# Patient Record
Sex: MALE | Race: WHITE
[De-identification: names, ages, dates, MRNs, and addresses within clinical notes are randomized per-mention and may not be internally consistent; named-entity substitution may affect disease eponyms.]

---

## 2017-06-19 ENCOUNTER — HOSPITAL ENCOUNTER (OUTPATIENT)
Dept: HOSPITAL 33 - SDC | Age: 77
Discharge: HOME | End: 2017-06-19
Attending: FAMILY MEDICINE
Payer: MEDICARE

## 2017-06-19 VITALS — DIASTOLIC BLOOD PRESSURE: 87 MMHG | SYSTOLIC BLOOD PRESSURE: 147 MMHG | HEART RATE: 59 BPM

## 2017-06-19 VITALS — OXYGEN SATURATION: 99 %

## 2017-06-19 DIAGNOSIS — Z79.4: ICD-10-CM

## 2017-06-19 DIAGNOSIS — E11.9: ICD-10-CM

## 2017-06-19 DIAGNOSIS — D12.5: Primary | ICD-10-CM

## 2017-06-19 PROCEDURE — 99100 ANES PT EXTEME AGE<1 YR&>70: CPT

## 2017-06-19 PROCEDURE — 0DBN8ZX EXCISION OF SIGMOID COLON, VIA NATURAL OR ARTIFICIAL OPENING ENDOSCOPIC, DIAGNOSTIC: ICD-10-PCS | Performed by: FAMILY MEDICINE

## 2017-06-19 PROCEDURE — 00810: CPT

## 2017-06-19 PROCEDURE — 82962 GLUCOSE BLOOD TEST: CPT

## 2017-06-19 PROCEDURE — 36415 COLL VENOUS BLD VENIPUNCTURE: CPT

## 2017-06-19 NOTE — OP
SURGERY DATE:    06/19/17



SURGERY TIME:      0743



PREOPERATIVE DIAGNOSIS:    

1.  HISTORY OF COLON POLYPS.



POSTOPERATIVE DIAGNOSIS:    

1.  SIGMOID COLON POLYP.



PROCEDURE:    

1.  Colonoscopy with polypectomy.



SURGEON:    Dr. Owen.



ANESTHESIA:    MAC. Medications given by the Anesthesia Department.



BRIEF HISTORY:    The patient is a 75 y/o WM patient presenting now for colonoscopic 
evaluation. He reports he cannot remember the last time he had a colonoscopy performed, 
although he has had colon polyps removed previously. The patient is felt to need to have 
endoscopic evaluation. He was reappraised of the risks of the procedure including the risk 
of perforation, phlebitis, untoward reaction to medication, bleeding, and missed lesions.  
The patient verbalized his understanding and desired to have the procedure performed.



DESCRIPTION OF PROCEDURE:    The patient was given the medications by the Anesthesia 
Department. He had continuous pulse oximetry, ECG monitoring, intermittent BP monitoring, 
and end tidal CO2 monitoring during the examination. He was placed in the left lateral 
decubitus position. A digital rectal examination was performed and revealed normal anal 
sphincter tone, no masses, and a normal prostate.  The flexible Olympus pediatric 
colonoscope was used to intubate the rectum.  A view of the colon was developed 
sequentially to the cecum.  Upon insertion and withdrawal, including a retroflex view in 
the rectum, was noted a 1.2 cm diameter sized polyp that was pedunculated in nature. This 
was removed using the polypectomy snare and retrieved for pathologic evaluation. Upon 
insertion and withdrawal, including a retroflex view in the rectum, no other mucosal 
lesions were encountered. The scope was removed from the patient who tolerated the 
procedure well and was sent back to OP recovery in good condition. The prep was noted to 
be fair.

## 2019-08-22 ENCOUNTER — HOSPITAL ENCOUNTER (OUTPATIENT)
Dept: HOSPITAL 33 - SDC | Age: 79
Discharge: HOME | End: 2019-08-22
Attending: FAMILY MEDICINE
Payer: MEDICARE

## 2019-08-22 VITALS — SYSTOLIC BLOOD PRESSURE: 139 MMHG | DIASTOLIC BLOOD PRESSURE: 62 MMHG | HEART RATE: 62 BPM

## 2019-08-22 VITALS — OXYGEN SATURATION: 97 %

## 2019-08-22 DIAGNOSIS — Z09: ICD-10-CM

## 2019-08-22 DIAGNOSIS — Z86.010: ICD-10-CM

## 2019-08-22 DIAGNOSIS — D12.5: Primary | ICD-10-CM

## 2019-08-22 DIAGNOSIS — E11.9: ICD-10-CM

## 2019-08-22 PROCEDURE — 99100 ANES PT EXTEME AGE<1 YR&>70: CPT

## 2019-08-22 PROCEDURE — 82962 GLUCOSE BLOOD TEST: CPT

## 2019-08-22 PROCEDURE — 88305 TISSUE EXAM BY PATHOLOGIST: CPT

## 2019-08-22 NOTE — OP
SURGERY DATE/TIME:  08/22/2019  0758    



PREOPERATIVE DIAGNOSIS:      History of colon polyps.



POSTOPERATIVE DIAGNOSIS:    Sigmoid colon polyp x1. 



PROCEDURE:    Colonoscopy. 



SURGEON: Bogdan Pruitt M.D.



ANESTHESIA:  MAC by Jovon Cross CRNA. 



ESTIMATED BLOOD LOSS:  Minimal. 



SPECIMENS:  Hot forceps polypectomy from the sigmoid colon x1.         



DESCRIPTION OF PROCEDURE: After informed written consent was obtained, the patient was 
taken to the endoscopy suite. He underwent monitored anesthesia and digital rectal exam 
showed normal sphincter tone and no internal lesions. The scope was inserted into the 
rectum and sequentially the entire colonic mucosa was traversed. The level of cecum was 
reached and verified with direct visualization of ileocecal valve. Upon withdrawal careful 
mucosal inspection revealed a small sessile polyp in the sigmoid colon, pedunculated and 
grasped at the base with hot forceps, cauterized and removed in multiple fragments but 
with good removal of the entire lesion. Good hemostasis following removal. The remainder 
of the exam was unremarkable. Prior to withdrawal retroflexion was performed and showed no 
internal lesions. The scope was removed and the patient was transferred to the recovery 
room in good condition.

## 2022-08-25 ENCOUNTER — HOSPITAL ENCOUNTER (EMERGENCY)
Dept: HOSPITAL 33 - ED | Age: 82
Discharge: LEFT BEFORE DISCHARGE | End: 2022-08-25
Payer: MEDICARE

## 2022-08-25 VITALS — OXYGEN SATURATION: 97 %

## 2022-08-25 VITALS — SYSTOLIC BLOOD PRESSURE: 146 MMHG | DIASTOLIC BLOOD PRESSURE: 88 MMHG | HEART RATE: 72 BPM

## 2022-08-25 DIAGNOSIS — Z79.899: ICD-10-CM

## 2022-08-25 DIAGNOSIS — Z72.0: ICD-10-CM

## 2022-08-25 DIAGNOSIS — R26.0: ICD-10-CM

## 2022-08-25 DIAGNOSIS — Z79.84: ICD-10-CM

## 2022-08-25 DIAGNOSIS — Z91.81: ICD-10-CM

## 2022-08-25 DIAGNOSIS — I63.9: Primary | ICD-10-CM

## 2022-08-25 DIAGNOSIS — E11.9: ICD-10-CM

## 2022-08-25 LAB
ALBUMIN SERPL-MCNC: 4.2 G/DL (ref 3.5–5)
ALP SERPL-CCNC: 97 U/L (ref 38–126)
ALT SERPL-CCNC: 16 U/L (ref 0–50)
AMPHETAMINES UR QL: NEGATIVE
ANION GAP SERPL CALC-SCNC: 15.3 MEQ/L (ref 5–15)
AST SERPL QL: 20 U/L (ref 17–59)
BARBITURATES UR QL: NEGATIVE
BASOPHILS # BLD AUTO: 0.08 X10^3/UL (ref 0–0.4)
BENZODIAZ UR QL SCN: NEGATIVE
BILIRUB BLD-MCNC: 0.9 MG/DL (ref 0.2–1.3)
BNP SERPL-MCNC: 250 PG/ML (ref 0–1800)
BUN SERPL-MCNC: 24 MG/DL (ref 9–20)
CALCIUM SPEC-MCNC: 9.5 MG/DL (ref 8.4–10.2)
CHLORIDE SERPL-SCNC: 106 MMOL/L (ref 98–107)
CO2 SERPL-SCNC: 20 MMOL/L (ref 22–30)
COCAINE UR QL SCN: NEGATIVE
CREAT SERPL-MCNC: 1.53 MG/DL (ref 0.66–1.25)
EOSINOPHIL # BLD AUTO: 0.17 X10^3/UL (ref 0–0.5)
GFR SERPLBLD BASED ON 1.73 SQ M-ARVRAT: 46.9 ML/MIN
GLUCOSE SERPL-MCNC: 134 MG/DL (ref 74–106)
GLUCOSE UR-MCNC: 500 MG/DL
HCT VFR BLD AUTO: 44.6 % (ref 42–50)
HGB BLD-MCNC: 14.8 G/DL (ref 12.5–18)
LYMPHOCYTES # SPEC AUTO: 1.66 X10^3/UL (ref 1–4.6)
MAGNESIUM SERPL-MCNC: 1.9 MG/DL (ref 1.6–2.3)
MCH RBC QN AUTO: 30.8 PG (ref 26–32)
MCHC RBC AUTO-ENTMCNC: 33.2 G/DL (ref 32–36)
METHADONE UR QL: NEGATIVE
MONOCYTES # BLD AUTO: 0.35 X10^3/UL (ref 0–1.3)
OPIATES UR QL: NEGATIVE
PCP UR QL CFM>20 NG/ML: NEGATIVE
PLATELET # BLD AUTO: 196 X10^3/UL (ref 150–450)
POTASSIUM SERPLBLD-SCNC: 4.1 MMOL/L (ref 3.5–5.1)
PROT SERPL-MCNC: 7.2 G/DL (ref 6.3–8.2)
PROT UR STRIP-MCNC: NEGATIVE MG/DL
RBC # BLD AUTO: 4.8 X10^6/UL (ref 4.1–5.6)
RBC # UR AUTO: NEGATIVE ERY/UL (ref 0–5)
RBC #/AREA URNS HPF: (no result) /HPF (ref 0–2)
SODIUM SERPL-SCNC: 137 MMOL/L (ref 137–145)
THC UR QL SCN: NEGATIVE
UA DIPSTICK PNL UR: (no result)
URINE CULTURED INDICATED?: NO
WBC # BLD AUTO: 5.7 X10^3/UL (ref 4–10.5)
WBC #/AREA URNS HPF: (no result) /HPF (ref 0–5)

## 2022-08-25 PROCEDURE — 99284 EMERGENCY DEPT VISIT MOD MDM: CPT

## 2022-08-25 PROCEDURE — 93005 ELECTROCARDIOGRAM TRACING: CPT

## 2022-08-25 PROCEDURE — 70450 CT HEAD/BRAIN W/O DYE: CPT

## 2022-08-25 PROCEDURE — 87040 BLOOD CULTURE FOR BACTERIA: CPT

## 2022-08-25 PROCEDURE — 80053 COMPREHEN METABOLIC PANEL: CPT

## 2022-08-25 PROCEDURE — 83880 ASSAY OF NATRIURETIC PEPTIDE: CPT

## 2022-08-25 PROCEDURE — 36000 PLACE NEEDLE IN VEIN: CPT

## 2022-08-25 PROCEDURE — 84484 ASSAY OF TROPONIN QUANT: CPT

## 2022-08-25 PROCEDURE — 83605 ASSAY OF LACTIC ACID: CPT

## 2022-08-25 PROCEDURE — 82947 ASSAY GLUCOSE BLOOD QUANT: CPT

## 2022-08-25 PROCEDURE — 84145 PROCALCITONIN (PCT): CPT

## 2022-08-25 PROCEDURE — 71045 X-RAY EXAM CHEST 1 VIEW: CPT

## 2022-08-25 PROCEDURE — 83735 ASSAY OF MAGNESIUM: CPT

## 2022-08-25 PROCEDURE — 81015 MICROSCOPIC EXAM OF URINE: CPT

## 2022-08-25 PROCEDURE — 85025 COMPLETE CBC W/AUTO DIFF WBC: CPT

## 2022-08-25 PROCEDURE — 36415 COLL VENOUS BLD VENIPUNCTURE: CPT

## 2022-08-25 PROCEDURE — 80307 DRUG TEST PRSMV CHEM ANLYZR: CPT

## 2022-08-25 NOTE — XRAY
Indication: Frequent falls, confusion, and unsteady gait one week.



Multiple contiguous axial images obtained through the head without contrast.



Comparison: None



Age-appropriate global atrophy, mild periventricular degenerative

micro-ischemia bilaterally, and small focus old infarct right cerebellum.

Posterior right parietal lobe demonstrates 3.3 x 2.2 x 2.0 cm focus of

subcortical hypoattenuation effacing the right lateral ventricle occipital

horn concerning for cytotoxic edema/ischemia.  No acute intracranial

hemorrhage, abnormal extra-axial fluid collection, or hydrocephalus.  Fourth

ventricle is midline.  Bony calvarium intact.  Visualized paranasal sinuses

and mastoid air cells are clear.



Impression:

1.  Right parietal lobe subcortical hypoattenuation with mass effect favoring

acute ischemia.  No acute intracranial hemorrhage.

2.  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

3.  Atrophy and degenerative micro-ischemia within normal limits for patient's

age.

## 2022-08-25 NOTE — ERPHSYRPT
- History of Present Illness


Time Seen by Provider: 08/25/22 09:57


Source: patient, family


Exam Limitations: no limitations


Patient Subjective Stated Complaint: PT states "Those two women wanted me to 

come, they think I am having a stroke.  I have been falling."


Triage Nursing Assessment: Pt presented alert and oriented X 3, skin pwd. PT 

ambulates with a slow upright steady gait, able to speak in clear full setences 

pt in no apparent respiratory distress.


Physician History: 





81 years old male with history of diabetes mellitus, tobacco abuse presented in 

the ER with chief complaint of strokelike symptoms.  Patient reports "I am here 

only because of these 2 ladies nagging me to come".  As per report patient is 

having off-and-on staggering of gait, frequent falls for the last few days 

getting easily off balance.  Denies hitting his head or loss of consciousness.  

Denies having dizziness or lightheadedness.  As per family patient has these 

episodes and does not happen all the time.  They are worried about him having a 

stroke.  Patient although denies having any numbness tingling or focal weakness.

 He walked in the ER without any difficulty.  Denies any blurry vision, slurring

of speech etc.  No chest pain palpitations or shortness of breath reported.  

Patient is angry and not happy camper to be in the hospital.  He states "I am 

totally fine and not planning on staying in the hospital for very long.


Timing/Duration: day(s) (3), intermittent, gradual onset, worse


Severity: moderate


Deficits: falling


Baseline/Normal Cognition: alert oriented x 3


Current Cognition: alert oriented x 3


Baseline Gait: walks w/o assistance


Associated Symptoms: trouble walking


Allergies/Adverse Reactions: 








No Known Drug Allergies Allergy (Verified 12/20/19 07:08)


   





Home Medications: 








Canagliflozin [Invokana] 300 mg PO DAILY 06/19/17 [History]


Metformin HCl 500 mg*** [Glucophage 500 MG***] 1,000 mg PO BID 06/19/17 

[History]


Terazosin HCl 5 mg*** [Hytrin 5Mg***] 5 mg PO DAILY 06/19/17 [History]


Dulaglutide [Trulicity] 0.75 mg SQ DAILY 08/25/22 [History]





Hx Tetanus, Diphtheria Vaccination/Date Given: Yes


Hx Influenza Vaccination/Date Given: Yes


Hx Pneumococcal Vaccination/Date Given: Yes


Immunizations Up to Date: Yes





Travel Risk





- International Travel


Have you traveled outside of the country in past 3 weeks: No





- Coronavirus Screening


Are you exhibiting any of the following symptoms?: No


Close contact with a COVID-19 positive Pt in past 14-21 Days: No





- Vaccine Status


Have you recieved a Covid-19 vaccination: Yes


: Moderna





- Vaccination Dates


Date of 2cond Vaccination (if applicable): 2021





- Review of Systems


Constitutional: No Symptoms


Eyes: No Symptoms


Ears, Nose, & Throat: No Symptoms


Respiratory: No Symptoms


Cardiac: No Symptoms


Abdominal/Gastrointestinal: No Symptoms


Genitourinary Symptoms: No Symptoms


Musculoskeletal: No Symptoms


Skin: No Symptoms


Neurological: No Symptoms


Psychological: No Symptoms


Endocrine: No Symptoms


Hematologic/Lymphatic: No Symptoms


Immunological/Allergic: No Symptoms





- Past Medical History


Pertinent Past Medical History: Yes


Neurological History: No Pertinent History


ENT History: No Pertinent History


Cardiac History: No Pertinent History


Respiratory History: No Pertinent History


Endocrine Medical History: Diabetes Type II


Musculoskeletal History: No Pertinent History


GI Medical History: No Pertinent History


 History: Other


Psycho-Social History: No Pertinent History


Male Reproductive Disorders: Prostate Problems


Other Medical History: kidney stones in the past used US for tx





- Past Surgical History


Past Surgical History: Yes


Neuro Surgical History: No Pertinent History


Cardiac: Cardiac Catheterization


Respiratory: No Pertinent History


Gastrointestinal: No Pertinent History


Genitourinary: No Pertinent History


Musculoskeletal: Orthopedic Surgery


Male Surgical History: No Pertinent History


Other Surgical History: colonoscopy x 2, polyps, rotator cuff repair.





- Social History


Smoking Status: Current every day smoker


How long have you smoked: years


Exposure to second hand smoke: Yes


Drug Use: none


Patient Lives Alone: No





- Nursing Vital Signs


Nursing Vital Signs: 


                               Initial Vital Signs











Temperature  97.5 F   08/25/22 09:24


 


Pulse Rate  68   08/25/22 09:24


 


Respiratory Rate  18   08/25/22 09:24


 


Blood Pressure  159/88   08/25/22 09:24


 


O2 Sat by Pulse Oximetry  98   08/25/22 09:24








                                   Pain Scale











Pain Intensity                 0

















- Jorge Coma Scale


Best Eye Response (Jorge): (4) open spontaneously


Best Verbal Response ([place]): (5) oriented


Best Motor Response ([place]): (6) obeys commands


Jorge Total: 15





- Physical Exam


General Appearance: no apparent distress, alert


Eye Exam: bilateral eye: normal inspection, PERRL, EOMI


Ears, Nose, Throat Exam: normal ENT inspection, TMs normal, pharynx normal, 

moist mucous membranes


Neck Exam: normal inspection, non-tender, supple, full range of motion


Respiratory: normal breath sounds, lungs clear


Cardiovascular: regular rate/rhythm, normal heart sounds


Gastrointestinal: soft, normal bowel sounds, No tenderness


Back Exam: normal inspection, normal range of motion


Extremity Exam: normal inspection, normal range of motion


Mental Status: alert, oriented x 3, agitated


CNs Exam: normal hearing, normal speech, PERRL


Coordination/Gait: normal finger to nose, normal gait, normal cerebellar 

function


Motor/Sensory: no motor deficit, no sensory deficit, no pronator drift, negative

 Babinski's sign


DTR: bicep (R): 2+, bicep (L): 2+, knee (R): 2+, knee (L): 2+, ankle (R): 2+, 

ankle (L): 2+


Skin Exam: normal color


**SpO2 Interpretation**: normal


SpO2: 97


O2 Delivery: Room Air





- Course


EKG Interpreted by Me: RATE (63), Sinus Rhythm, Q-wave (Inferior leads), Non-

specific ST Changes


Ordered Tests: 


                               Active Orders 24 hr











 Category Date Time Status


 


 AMA [Release AMA] OM.NOW Care  08/25/22 13:27 Completed


 


 EKG-ER Only STAT Care  08/25/22 09:57 Completed


 


 IV Insertion STAT Care  08/25/22 09:57 Completed


 


 NPO (ED) STAT Care  08/25/22 09:57 Completed


 


 POCT Glucose Check STAT Care  08/25/22 09:57 Completed


 


 CHEST 1 VIEW (PORTABLE) Stat Exams  08/25/22 09:57 Completed


 


 HEAD WITHOUT CONTRAST [CT] Stat Exams  08/25/22 09:57 Completed


 


 BLOOD CULTURE Stat Lab  08/25/22 10:15 Received


 


 BNP [NT PRO BNP] Stat Lab  08/25/22 10:00 Completed


 


 CBC W DIFF Stat Lab  08/25/22 09:57 Completed


 


 CMP Stat Lab  08/25/22 10:00 Completed


 


 Lactic Acid Stat Lab  08/25/22 10:10 Completed


 


 MAG [MAGNESIUM] Stat Lab  08/25/22 10:00 Completed


 


 POCT GLUCOSE Stat Lab  08/25/22 09:30 Completed


 


 PROCALCITONIN Stat Lab  08/25/22 10:00 Completed


 


 TROPONIN Q3H Lab  08/25/22 10:00 Completed


 


 UA W/RFX CULTURE Stat Lab  08/25/22 11:26 Completed


 


 Urine Triage Profile Stat Lab  08/25/22 11:36 Ordered








Medication Summary














Discontinued Medications














Generic Name Dose Route Start Last Admin





  Trade Name Jo  PRN Reason Stop Dose Admin


 


Sodium Chloride  500 mls @ 500 mls/hr  08/25/22 09:58  08/25/22 11:26





  Sodium Chloride 0.9% 500 Ml  IV  08/25/22 10:57  Infused





  .Q1H ONE   Infusion


 


Sodium Chloride  Confirm  08/25/22 10:20 





  Sodium Chloride 0.9% 500 Ml  Administered  08/25/22 10:21 





  Dose  





  500 mls @ ud  





  IV  





  .STK-MED ONE  











Lab/Rad Data: 


                           Laboratory Result Diagrams





                                 08/25/22 09:57 





                                 08/25/22 10:00 





                               Laboratory Results











  08/25/22 08/25/22 08/25/22 Range/Units





  11:26 10:10 10:00 


 


WBC     (4.0-10.5)  x10^3/uL


 


RBC     (4.1-5.6)  x10^6/uL


 


Hgb     (12.5-18.0)  g/dL


 


Hct     (42-50)  %


 


MCV     ()  fL


 


MCH     (26-32)  pg


 


MCHC     (32-36)  g/dL


 


RDW     (11.5-14.0)  %


 


Plt Count     (150-450)  x10^3/uL


 


MPV     (7.5-11.0)  fL


 


Gran %     (36.0-66.0)  %


 


Immature Gran % (Auto)     (0.00-0.4)  %


 


Nucleat RBC Rel Count     (0.00-0.1)  %


 


Eos # (Auto)     (0-0.5)  x10^3/uL


 


Immature Gran # (Auto)     (0.00-0.03)  x10^3u/L


 


Absolute Lymphs (auto)     (1.0-4.6)  x10^3/uL


 


Absolute Monos (auto)     (0.0-1.3)  x10^3/uL


 


Absolute Nucleated RBC     (0.00-0.01)  x10^3u/L


 


Lymphocytes %     (24.0-44.0)  %


 


Monocytes %     (0.0-12.0)  %


 


Eosinophils %     (0.00-5.0)  %


 


Basophils %     (0.0-0.4)  %


 


Absolute Granulocytes     (1.4-6.9)  x10^3/uL


 


Basophils #     (0-0.4)  x10^3/uL


 


Sodium     (137-145)  mmol/L


 


Potassium     (3.5-5.1)  mmol/L


 


Chloride     ()  mmol/L


 


Carbon Dioxide     (22-30)  mmol/L


 


Anion Gap     (5-15)  MEQ/L


 


BUN     (9-20)  mg/dL


 


Creatinine     (0.66-1.25)  mg/dL


 


Estimated GFR     ML/MIN


 


Glucose     ()  mg/dL


 


POC Glucometer     (74 to 106)  mg/dL


 


Lactic Acid   0.9   (0.4-2.0)  


 


Calcium     (8.4-10.2)  mg/dL


 


Magnesium     (1.6-2.3)  mg/dL


 


Total Bilirubin     (0.2-1.3)  mg/dL


 


AST     (17-59)  U/L


 


ALT     (0-50)  U/L


 


Alkaline Phosphatase     ()  U/L


 


Troponin I     (0.000-0.034)  ng/mL


 


NT-Pro-B Natriuret Pep     (0-1800)  pg/mL


 


Serum Total Protein     (6.3-8.2)  g/dL


 


Albumin     (3.5-5.0)  g/dL


 


Procalcitonin    0.042  (0.030-0.080)  ng/mL


 


Urinalys Dipstick Clnc  MAIN LAB    


 


Urine Color  YELLOW    (YELLOW)  


 


Urine Appearance  CLEAR    (CLEAR)  


 


Urine pH  6.0    (5-6)  


 


Ur Specific Gravity  1.025    (1.005-1.025)  


 


POC Urine Protein Conf  NEGATIVE    (Negative)  


 


Urine Ketones  SMALL-15    (NEGATIVE)  


 


Urine Nitrite  NEGATIVE    (NEGATIVE)  


 


Urine Bilirubin  NEGATIVE    (NEGATIVE)  


 


Urine Urobilinogen  0.2    (0-1)  mg/dL


 


Urine Leukocytes  NEGATIVE    (NEGATIVE)  


 


Urine WBC (Auto)  NONE    (0-5)  /HPF


 


Urine RBC (Auto)  NONE    (0-2)  /HPF


 


U Epithel Cells (Auto)  NONE    (FEW)  /HPF


 


Urine Bacteria (Auto)  NONE    (NEGATIVE)  /HPF


 


Urine RBC  NEGATIVE    (0-5)  Edmundo/ul


 


Ur Culture Indicated?  NO    


 


Urine Glucose  500    (NEGATIVE)  mg/dL














  08/25/22 08/25/22 08/25/22 Range/Units





  10:00 10:00 10:00 


 


WBC     (4.0-10.5)  x10^3/uL


 


RBC     (4.1-5.6)  x10^6/uL


 


Hgb     (12.5-18.0)  g/dL


 


Hct     (42-50)  %


 


MCV     ()  fL


 


MCH     (26-32)  pg


 


MCHC     (32-36)  g/dL


 


RDW     (11.5-14.0)  %


 


Plt Count     (150-450)  x10^3/uL


 


MPV     (7.5-11.0)  fL


 


Gran %     (36.0-66.0)  %


 


Immature Gran % (Auto)     (0.00-0.4)  %


 


Nucleat RBC Rel Count     (0.00-0.1)  %


 


Eos # (Auto)     (0-0.5)  x10^3/uL


 


Immature Gran # (Auto)     (0.00-0.03)  x10^3u/L


 


Absolute Lymphs (auto)     (1.0-4.6)  x10^3/uL


 


Absolute Monos (auto)     (0.0-1.3)  x10^3/uL


 


Absolute Nucleated RBC     (0.00-0.01)  x10^3u/L


 


Lymphocytes %     (24.0-44.0)  %


 


Monocytes %     (0.0-12.0)  %


 


Eosinophils %     (0.00-5.0)  %


 


Basophils %     (0.0-0.4)  %


 


Absolute Granulocytes     (1.4-6.9)  x10^3/uL


 


Basophils #     (0-0.4)  x10^3/uL


 


Sodium    137  (137-145)  mmol/L


 


Potassium    4.1  (3.5-5.1)  mmol/L


 


Chloride    106  ()  mmol/L


 


Carbon Dioxide    20 L  (22-30)  mmol/L


 


Anion Gap    15.3 H  (5-15)  MEQ/L


 


BUN    24 H  (9-20)  mg/dL


 


Creatinine    1.53 H  (0.66-1.25)  mg/dL


 


Estimated GFR    46.9  ML/MIN


 


Glucose    134 H  ()  mg/dL


 


POC Glucometer     (74 to 106)  mg/dL


 


Lactic Acid     (0.4-2.0)  


 


Calcium    9.5  (8.4-10.2)  mg/dL


 


Magnesium   1.9   (1.6-2.3)  mg/dL


 


Total Bilirubin    0.90  (0.2-1.3)  mg/dL


 


AST    20  (17-59)  U/L


 


ALT    16  (0-50)  U/L


 


Alkaline Phosphatase    97  ()  U/L


 


Troponin I  < 0.012    (0.000-0.034)  ng/mL


 


NT-Pro-B Natriuret Pep   250   (0-1800)  pg/mL


 


Serum Total Protein    7.2  (6.3-8.2)  g/dL


 


Albumin    4.2  (3.5-5.0)  g/dL


 


Procalcitonin     (0.030-0.080)  ng/mL


 


Urinalys Dipstick Clnc     


 


Urine Color     (YELLOW)  


 


Urine Appearance     (CLEAR)  


 


Urine pH     (5-6)  


 


Ur Specific Gravity     (1.005-1.025)  


 


POC Urine Protein Conf     (Negative)  


 


Urine Ketones     (NEGATIVE)  


 


Urine Nitrite     (NEGATIVE)  


 


Urine Bilirubin     (NEGATIVE)  


 


Urine Urobilinogen     (0-1)  mg/dL


 


Urine Leukocytes     (NEGATIVE)  


 


Urine WBC (Auto)     (0-5)  /HPF


 


Urine RBC (Auto)     (0-2)  /HPF


 


U Epithel Cells (Auto)     (FEW)  /HPF


 


Urine Bacteria (Auto)     (NEGATIVE)  /HPF


 


Urine RBC     (0-5)  Edmundo/ul


 


Ur Culture Indicated?     


 


Urine Glucose     (NEGATIVE)  mg/dL














  08/25/22 08/25/22 Range/Units





  09:57 09:30 


 


WBC  5.7   (4.0-10.5)  x10^3/uL


 


RBC  4.80   (4.1-5.6)  x10^6/uL


 


Hgb  14.8   (12.5-18.0)  g/dL


 


Hct  44.6   (42-50)  %


 


MCV  92.9   ()  fL


 


MCH  30.8   (26-32)  pg


 


MCHC  33.2   (32-36)  g/dL


 


RDW  13.0   (11.5-14.0)  %


 


Plt Count  196   (150-450)  x10^3/uL


 


MPV  9.9   (7.5-11.0)  fL


 


Gran %  60.0   (36.0-66.0)  %


 


Immature Gran % (Auto)  0.4   (0.00-0.4)  %


 


Nucleat RBC Rel Count  0.0   (0.00-0.1)  %


 


Eos # (Auto)  0.17   (0-0.5)  x10^3/uL


 


Immature Gran # (Auto)  0.02   (0.00-0.03)  x10^3u/L


 


Absolute Lymphs (auto)  1.66   (1.0-4.6)  x10^3/uL


 


Absolute Monos (auto)  0.35   (0.0-1.3)  x10^3/uL


 


Absolute Nucleated RBC  0.00   (0.00-0.01)  x10^3u/L


 


Lymphocytes %  29.1   (24.0-44.0)  %


 


Monocytes %  6.1   (0.0-12.0)  %


 


Eosinophils %  3.0   (0.00-5.0)  %


 


Basophils %  1.4   (0.0-0.4)  %


 


Absolute Granulocytes  3.42   (1.4-6.9)  x10^3/uL


 


Basophils #  0.08   (0-0.4)  x10^3/uL


 


Sodium    (137-145)  mmol/L


 


Potassium    (3.5-5.1)  mmol/L


 


Chloride    ()  mmol/L


 


Carbon Dioxide    (22-30)  mmol/L


 


Anion Gap    (5-15)  MEQ/L


 


BUN    (9-20)  mg/dL


 


Creatinine    (0.66-1.25)  mg/dL


 


Estimated GFR    ML/MIN


 


Glucose    ()  mg/dL


 


POC Glucometer   147 H  (74 to 106)  mg/dL


 


Lactic Acid    (0.4-2.0)  


 


Calcium    (8.4-10.2)  mg/dL


 


Magnesium    (1.6-2.3)  mg/dL


 


Total Bilirubin    (0.2-1.3)  mg/dL


 


AST    (17-59)  U/L


 


ALT    (0-50)  U/L


 


Alkaline Phosphatase    ()  U/L


 


Troponin I    (0.000-0.034)  ng/mL


 


NT-Pro-B Natriuret Pep    (0-1800)  pg/mL


 


Serum Total Protein    (6.3-8.2)  g/dL


 


Albumin    (3.5-5.0)  g/dL


 


Procalcitonin    (0.030-0.080)  ng/mL


 


Urinalys Dipstick Clnc    


 


Urine Color    (YELLOW)  


 


Urine Appearance    (CLEAR)  


 


Urine pH    (5-6)  


 


Ur Specific Gravity    (1.005-1.025)  


 


POC Urine Protein Conf    (Negative)  


 


Urine Ketones    (NEGATIVE)  


 


Urine Nitrite    (NEGATIVE)  


 


Urine Bilirubin    (NEGATIVE)  


 


Urine Urobilinogen    (0-1)  mg/dL


 


Urine Leukocytes    (NEGATIVE)  


 


Urine WBC (Auto)    (0-5)  /HPF


 


Urine RBC (Auto)    (0-2)  /HPF


 


U Epithel Cells (Auto)    (FEW)  /HPF


 


Urine Bacteria (Auto)    (NEGATIVE)  /HPF


 


Urine RBC    (0-5)  Edmundo/ul


 


Ur Culture Indicated?    


 


Urine Glucose    (NEGATIVE)  mg/dL














- Progress


Progress: unchanged


Progress Note: 





08/25/22 11:55


Patient is made stroke activate, broad work-up was done.  CT head without 

contrast showed right parietal lobe subcortical hypoattenuation with mass-effect

 favoring acute ischemia.  Also has small old infarct right cerebellum.  SOC 

neurology consult is obtained who is concerned about patient having stroke 

versus tumor and recommended further work-up as patient is not a candidate for 

tPA and also has a NIH score of 0 currently.  Baseline work-up grossly 

unremarkable otherwise.  Patient is not a happy camper, discussed with him 

neurology recommendations and suggested admission with further work-up which 

made him upset.  He does not want to stay in the hospital at all.  Patient 

states "I cannot stay in the hospital even 1 more minute and I have to leave 

right now".  Discussed with him in detail about risk of leaving without full 

work-up which could not only lead to delaying the diagnosis, worsening of 

condition and including death which he seems understanding but still wants to l

eave.  He is not confused or altered but unhappy about being in the hospital, 

signed AMA papers and he walked out of the ER with a steady gait.  Recommended 

outpatient follow-up but he left without paperwork.








Counseled pt/family regarding: lab results, diagnosis, need for follow-up, rad 

results





- Departure


Departure Disposition: AMA


Clinical Impression: 


 Stroke





Condition: Stable


Critical Care Time: No


Referrals: 


ANKIT CEDILLO MD [Primary Care Provider] - Follow up/PCP as directed

## 2022-08-25 NOTE — XRAY
Indication: Frequent falls.  Confusion.



Comparison: April 18, 2013.



Portable chest inflated and clear.  Heart not enlarged.  Bony thorax intact

again with mild osteopenia and degenerative changes.



Impression: Continued nonacute chest.

## 2024-11-19 ENCOUNTER — HOSPITAL ENCOUNTER (OUTPATIENT)
Dept: HOSPITAL 33 - SDC | Age: 84
Discharge: HOME | End: 2024-11-19
Attending: OPHTHALMOLOGY
Payer: MEDICARE

## 2024-11-19 VITALS — HEART RATE: 69 BPM

## 2024-11-19 VITALS — SYSTOLIC BLOOD PRESSURE: 123 MMHG | OXYGEN SATURATION: 95 % | DIASTOLIC BLOOD PRESSURE: 71 MMHG

## 2024-11-19 VITALS — TEMPERATURE: 96.9 F

## 2024-11-19 VITALS — RESPIRATION RATE: 16 BRPM

## 2024-11-19 DIAGNOSIS — H25.812: Primary | ICD-10-CM

## 2024-11-19 DIAGNOSIS — E11.9: ICD-10-CM

## 2024-11-19 PROCEDURE — C1780 LENS, INTRAOCULAR (NEW TECH): HCPCS

## 2024-11-19 PROCEDURE — 93005 ELECTROCARDIOGRAM TRACING: CPT

## 2024-11-19 PROCEDURE — 99100 ANES PT EXTEME AGE<1 YR&>70: CPT

## 2024-11-19 PROCEDURE — 82947 ASSAY GLUCOSE BLOOD QUANT: CPT

## 2024-11-19 RX ADMIN — ACETAZOLAMIDE ONE MG: 250 TABLET ORAL at 09:11

## 2024-11-19 RX ADMIN — TETRACAINE HYDROCHLORIDE ONE ML: 5 SOLUTION OPHTHALMIC at 07:43

## 2024-11-19 RX ADMIN — PHENYLEPHRINE HYDROCHLORIDE ONE ML: 2.5 SOLUTION/ DROPS OPHTHALMIC at 07:02

## 2024-11-19 RX ADMIN — TETRACAINE HYDROCHLORIDE ONE ML: 5 SOLUTION OPHTHALMIC at 07:01

## 2024-12-17 ENCOUNTER — HOSPITAL ENCOUNTER (OUTPATIENT)
Dept: HOSPITAL 33 - SDC | Age: 84
Discharge: LEFT BEFORE DISCHARGE | End: 2024-12-17
Attending: OPHTHALMOLOGY
Payer: MEDICARE

## 2024-12-17 VITALS — RESPIRATION RATE: 18 BRPM

## 2024-12-17 VITALS — OXYGEN SATURATION: 97 %

## 2024-12-17 VITALS — HEART RATE: 80 BPM

## 2024-12-17 VITALS — TEMPERATURE: 97.6 F

## 2024-12-17 VITALS — SYSTOLIC BLOOD PRESSURE: 212 MMHG | DIASTOLIC BLOOD PRESSURE: 112 MMHG

## 2024-12-17 DIAGNOSIS — H25.811: Primary | ICD-10-CM

## 2024-12-17 DIAGNOSIS — E11.9: ICD-10-CM

## 2024-12-17 PROCEDURE — C1780 LENS, INTRAOCULAR (NEW TECH): HCPCS

## 2024-12-17 PROCEDURE — 93005 ELECTROCARDIOGRAM TRACING: CPT

## 2024-12-17 RX ADMIN — TETRACAINE HYDROCHLORIDE ONE ML: 5 SOLUTION OPHTHALMIC at 11:17

## 2024-12-17 RX ADMIN — TETRACAINE HYDROCHLORIDE ONE ML: 5 SOLUTION OPHTHALMIC at 11:46

## 2024-12-17 RX ADMIN — PHENYLEPHRINE HYDROCHLORIDE ONE ML: 2.5 SOLUTION/ DROPS OPHTHALMIC at 11:18

## 2024-12-17 RX ADMIN — ACETAZOLAMIDE ONE MG: 250 TABLET ORAL at 13:09
